# Patient Record
Sex: MALE | Race: WHITE | NOT HISPANIC OR LATINO | URBAN - METROPOLITAN AREA
[De-identification: names, ages, dates, MRNs, and addresses within clinical notes are randomized per-mention and may not be internally consistent; named-entity substitution may affect disease eponyms.]

---

## 2023-10-27 ENCOUNTER — APPOINTMENT (RX ONLY)
Dept: URBAN - METROPOLITAN AREA CLINIC 98 | Facility: CLINIC | Age: 23
Setting detail: DERMATOLOGY
End: 2023-10-27

## 2023-10-27 DIAGNOSIS — L30.1 DYSHIDROSIS [POMPHOLYX]: ICD-10-CM

## 2023-10-27 PROCEDURE — 99204 OFFICE O/P NEW MOD 45 MIN: CPT

## 2023-10-27 PROCEDURE — ? COUNSELING

## 2023-10-27 PROCEDURE — ? PRESCRIPTION

## 2023-10-27 PROCEDURE — ? TREATMENT REGIMEN

## 2023-10-27 RX ORDER — CLOBETASOL PROPIONATE 0.5 MG/G
OINTMENT TOPICAL
Qty: 15 | Refills: 0 | Status: ERX | COMMUNITY
Start: 2023-10-27

## 2023-10-27 RX ADMIN — CLOBETASOL PROPIONATE: 0.5 OINTMENT TOPICAL at 00:00

## 2023-10-27 ASSESSMENT — LOCATION ZONE DERM: LOCATION ZONE: FINGER

## 2023-10-27 ASSESSMENT — LOCATION SIMPLE DESCRIPTION DERM
LOCATION SIMPLE: RIGHT SMALL FINGER
LOCATION SIMPLE: LEFT MIDDLE FINGER
LOCATION SIMPLE: RIGHT RING FINGER
LOCATION SIMPLE: RIGHT MIDDLE FINGER
LOCATION SIMPLE: LEFT INDEX FINGER

## 2023-10-27 ASSESSMENT — LOCATION DETAILED DESCRIPTION DERM
LOCATION DETAILED: RIGHT PROXIMAL PALMAR MIDDLE FINGER
LOCATION DETAILED: LEFT DISTAL PALMAR INDEX FINGER
LOCATION DETAILED: LEFT PROXIMAL PALMAR MIDDLE FINGER
LOCATION DETAILED: RIGHT PROXIMAL PALMAR RING FINGER
LOCATION DETAILED: RIGHT DISTAL PALMAR SMALL FINGER

## 2023-10-27 NOTE — PROCEDURE: TREATMENT REGIMEN
Detail Level: Zone
Initiate Treatment: clobetasol 0.05 % topical ointment \\nSig: Apply BID to rash on hands as needed

## 2024-01-30 ENCOUNTER — APPOINTMENT (RX ONLY)
Dept: URBAN - METROPOLITAN AREA CLINIC 113 | Facility: CLINIC | Age: 24
Setting detail: DERMATOLOGY
End: 2024-01-30

## 2024-01-30 DIAGNOSIS — R21 RASH AND OTHER NONSPECIFIC SKIN ERUPTION: ICD-10-CM

## 2024-01-30 PROCEDURE — ? ADDITIONAL NOTES

## 2024-01-30 PROCEDURE — ? COUNSELING

## 2024-01-30 PROCEDURE — ? PATHOLOGY BILLING

## 2024-01-30 PROCEDURE — 88305 TISSUE EXAM BY PATHOLOGIST: CPT

## 2024-01-30 PROCEDURE — ? BIOPSY BY PUNCH METHOD

## 2024-01-30 PROCEDURE — 11104 PUNCH BX SKIN SINGLE LESION: CPT

## 2024-01-30 ASSESSMENT — LOCATION SIMPLE DESCRIPTION DERM: LOCATION SIMPLE: RIGHT THUMB

## 2024-01-30 ASSESSMENT — LOCATION ZONE DERM: LOCATION ZONE: FINGER

## 2024-01-30 ASSESSMENT — LOCATION DETAILED DESCRIPTION DERM: LOCATION DETAILED: RIGHT DISTAL VENTRAL THUMB

## 2024-01-30 NOTE — PROCEDURE: ADDITIONAL NOTES
Detail Level: Simple
Render Risk Assessment In Note?: no
Additional Notes: Explained DDx.  Discussed that systemic options are different for psoriasis vs eczema, recommended biopsy for more definitive diagnosis.

## 2024-02-13 ENCOUNTER — APPOINTMENT (RX ONLY)
Dept: URBAN - METROPOLITAN AREA CLINIC 113 | Facility: CLINIC | Age: 24
Setting detail: DERMATOLOGY
End: 2024-02-13

## 2024-02-13 DIAGNOSIS — Z48.02 ENCOUNTER FOR REMOVAL OF SUTURES: ICD-10-CM

## 2024-02-13 DIAGNOSIS — L20.89 OTHER ATOPIC DERMATITIS: ICD-10-CM | Status: INADEQUATELY CONTROLLED

## 2024-02-13 DIAGNOSIS — L08.9 LOCAL INFECTION OF THE SKIN AND SUBCUTANEOUS TISSUE, UNSPECIFIED: ICD-10-CM

## 2024-02-13 PROCEDURE — ? COUNSELING

## 2024-02-13 PROCEDURE — ? SUTURE REMOVAL

## 2024-02-13 PROCEDURE — ? TREATMENT REGIMEN

## 2024-02-13 PROCEDURE — 99214 OFFICE O/P EST MOD 30 MIN: CPT

## 2024-02-13 PROCEDURE — ? CIBINQO INITIATION

## 2024-02-13 PROCEDURE — ? PRESCRIPTION

## 2024-02-13 PROCEDURE — ? ORDER TESTS

## 2024-02-13 RX ORDER — CIPROFLOXACIN 500 MG/1
TABLET, FILM COATED ORAL
Qty: 28 | Refills: 0 | Status: ERX | COMMUNITY
Start: 2024-02-13

## 2024-02-13 RX ADMIN — CIPROFLOXACIN: 500 TABLET, FILM COATED ORAL at 00:00

## 2024-02-13 ASSESSMENT — LOCATION SIMPLE DESCRIPTION DERM
LOCATION SIMPLE: LEFT PRETIBIAL REGION
LOCATION SIMPLE: LEFT HAND
LOCATION SIMPLE: RIGHT THUMB
LOCATION SIMPLE: RIGHT HAND
LOCATION SIMPLE: RIGHT PRETIBIAL REGION

## 2024-02-13 ASSESSMENT — ITCH NUMERIC RATING SCALE: HOW SEVERE IS YOUR ITCHING?: 10

## 2024-02-13 ASSESSMENT — LOCATION DETAILED DESCRIPTION DERM
LOCATION DETAILED: RIGHT DISTAL PRETIBIAL REGION
LOCATION DETAILED: LEFT THENAR EMINENCE
LOCATION DETAILED: RIGHT RADIAL PALM
LOCATION DETAILED: LEFT DISTAL PRETIBIAL REGION
LOCATION DETAILED: RIGHT PROXIMAL RADIAL THUMB

## 2024-02-13 ASSESSMENT — LOCATION ZONE DERM
LOCATION ZONE: HAND
LOCATION ZONE: LEG
LOCATION ZONE: FINGER

## 2024-02-13 ASSESSMENT — SEVERITY ASSESSMENT 2020: SEVERITY 2020: SEVERE

## 2024-02-13 ASSESSMENT — BSA ECZEMA: % BODY COVERED IN ECZEMA: 6

## 2024-02-13 NOTE — PROCEDURE: ORDER TESTS
Bill For Surgical Tray: no
Performing Laboratory: [id number]
Expected Date Of Service: 02/13/2024
Billing Type: Third-Party Bill
Lab Facility: [id number]

## 2024-02-13 NOTE — PROCEDURE: TREATMENT REGIMEN
Detail Level: Detailed
Plan: Used a sterile 18 gauge needle to drain a significant amount of brown fluid. He was advised to hold Cibinqo sample give for 4-5 days until infection has improved on antibiotics.

## 2024-02-13 NOTE — PROCEDURE: CIBINQO INITIATION
Add Pregnancy And Lactation Warning To Medication Counseling?: No
Cibinqo Monitoring Guidelines: CBC, lipids, hepatitis screening, and TB screening should be checked prior to initiating Cibinqo therapy.  Labs may also be checked periodically after the initiation period.
Cibinqo Dosing: 100mg Daily
Diagnosis (Required): Atopic Dermatitis/Eczematous Dermatitis
Pregnancy And Lactation Warning Text: It is unknown if this medication will adversely affect pregnancy or breast feeding.  You should not take this medication if you are currently pregnant or planning a pregnancy or while breastfeeding.
Detail Level: Zone

## 2024-02-15 ENCOUNTER — APPOINTMENT (RX ONLY)
Dept: URBAN - METROPOLITAN AREA CLINIC 113 | Facility: CLINIC | Age: 24
Setting detail: DERMATOLOGY
End: 2024-02-15

## 2024-02-15 DIAGNOSIS — L08.9 LOCAL INFECTION OF THE SKIN AND SUBCUTANEOUS TISSUE, UNSPECIFIED: ICD-10-CM | Status: IMPROVED

## 2024-02-15 PROCEDURE — ? COUNSELING

## 2024-02-15 PROCEDURE — ? PRESCRIPTION

## 2024-02-15 PROCEDURE — ? TREATMENT REGIMEN

## 2024-02-15 RX ORDER — SULFAMETHOXAZOLE AND TRIMETHOPRIM 800; 160 MG/1; MG/1
TABLET ORAL BID
Qty: 28 | Refills: 1 | Status: CANCELLED | COMMUNITY
Start: 2024-02-15

## 2024-02-15 RX ORDER — SULFAMETHOXAZOLE AND TRIMETHOPRIM 800; 160 MG/1; MG/1
TABLET ORAL BID
Qty: 20 | Refills: 1 | Status: ERX

## 2024-02-15 RX ADMIN — SULFAMETHOXAZOLE AND TRIMETHOPRIM: 800; 160 TABLET ORAL at 00:00

## 2024-02-15 NOTE — PROCEDURE: TREATMENT REGIMEN
Continue Regimen: Ciprofaxacin BID x14 days
Detail Level: Detailed
Plan: Since culture is not back, sent in Bactrim to cover him for staph in case he gets worse over the weekend, and advised him to go to the ER if leg pain does not improve within the next 24-48 hours.
Initiate Treatment: Bactrim DS BID x10 days

## 2024-02-27 ENCOUNTER — APPOINTMENT (RX ONLY)
Dept: URBAN - METROPOLITAN AREA CLINIC 113 | Facility: CLINIC | Age: 24
Setting detail: DERMATOLOGY
End: 2024-02-27

## 2024-02-27 DIAGNOSIS — L20.89 OTHER ATOPIC DERMATITIS: ICD-10-CM | Status: IMPROVED

## 2024-02-27 DIAGNOSIS — L08.9 LOCAL INFECTION OF THE SKIN AND SUBCUTANEOUS TISSUE, UNSPECIFIED: ICD-10-CM

## 2024-02-27 PROCEDURE — ? PRESCRIPTION

## 2024-02-27 PROCEDURE — ? COUNSELING

## 2024-02-27 PROCEDURE — ? DIAGNOSIS COMMENT

## 2024-02-27 PROCEDURE — 99213 OFFICE O/P EST LOW 20 MIN: CPT

## 2024-02-27 PROCEDURE — ? TREATMENT REGIMEN

## 2024-02-27 PROCEDURE — ? LAB REPORTS REVIEWED

## 2024-02-27 RX ORDER — CLOBETASOL PROPIONATE 0.5 MG/G
OINTMENT TOPICAL
Qty: 30 | Refills: 0 | Status: ERX

## 2024-02-27 RX ORDER — MUPIROCIN 20 MG/G
OINTMENT TOPICAL
Qty: 22 | Refills: 1 | Status: ERX | COMMUNITY
Start: 2024-02-27

## 2024-02-27 RX ADMIN — MUPIROCIN: 20 OINTMENT TOPICAL at 00:00

## 2024-02-27 ASSESSMENT — LOCATION DETAILED DESCRIPTION DERM
LOCATION DETAILED: LEFT ANKLE
LOCATION DETAILED: RIGHT RADIAL PALM
LOCATION DETAILED: RIGHT DISTAL PRETIBIAL REGION
LOCATION DETAILED: LEFT DISTAL PRETIBIAL REGION
LOCATION DETAILED: LEFT THENAR EMINENCE

## 2024-02-27 ASSESSMENT — LOCATION SIMPLE DESCRIPTION DERM
LOCATION SIMPLE: LEFT ANKLE
LOCATION SIMPLE: LEFT PRETIBIAL REGION
LOCATION SIMPLE: LEFT HAND
LOCATION SIMPLE: RIGHT HAND
LOCATION SIMPLE: RIGHT PRETIBIAL REGION

## 2024-02-27 ASSESSMENT — LOCATION ZONE DERM
LOCATION ZONE: HAND
LOCATION ZONE: LEG

## 2024-02-27 ASSESSMENT — ITCH NUMERIC RATING SCALE: HOW SEVERE IS YOUR ITCHING?: 7

## 2024-02-27 ASSESSMENT — SEVERITY ASSESSMENT 2020: SEVERITY 2020: MILD

## 2024-02-27 ASSESSMENT — BSA ECZEMA: % BODY COVERED IN ECZEMA: 6

## 2024-02-27 NOTE — PROCEDURE: LAB REPORTS REVIEWED
Summarized Lab Results: Growth of staph aureus and group A strep
Detail Level: Zone
Labs Reviewed Override: bacterial culture

## 2024-02-27 NOTE — PROCEDURE: TREATMENT REGIMEN
Detail Level: Detailed
Plan: Infection appears to be resolved, no need for PCN to cover for GAS at this time, but will have pt use mupirocin BID x 2 weeks for decolonization of both staph and strep.
Otc Regimen: Hibiclens or chlorhexadine wash daily
Initiate Treatment: Mupirocin 2% ointment twice daily x 1-2 weeks
Plan: The patient reports that the improvement in his eczema happened when he was taking antibiotics and had not yet begun Cibinqo. He elects to hold off on starting Cibinqo for now and see if the eczema flares back up again.  Explained that the severity of the eczema may have been driven by bacterial consolidation.  Start Hibicleans wash daily in the shower on the hands and feet.  Use mupirocin for 2 weeks.  Decolonize the nares.  If eczema stays controlled with OTC moisturizers and intermittent use of topical steroid, no need for Cibinquo.  RTC if not controlled.